# Patient Record
Sex: MALE | Race: WHITE | URBAN - METROPOLITAN AREA
[De-identification: names, ages, dates, MRNs, and addresses within clinical notes are randomized per-mention and may not be internally consistent; named-entity substitution may affect disease eponyms.]

---

## 2017-11-15 NOTE — PROGRESS NOTES
SUBJECTIVE:                                                    Robbin Adam is a 51 year old male who presents to clinic today for the following health issues:    Nail Problem      Duration: 1 year     Description (location/character/radiation): mainly left hand, did notice on a toe in the past. Dry cracking nails, white, possible fungus     Intensity:  severe    Accompanying signs and symptoms: some pain as nails crack open     History (similar episodes/previous evaluation): None    Precipitating or alleviating factors: None    Therapies tried and outcome: otc fungus products - no relief    Has noticed left great toe nail thickening for years also. No pain.     Denies joint pains, rashes, other skin changes, or other sx.  Pt states he washes his hands a lot.  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    Problem list and histories reviewed & adjusted, as indicated.    Patient Active Problem List   Diagnosis     Onychomycosis     Past Surgical History:   Procedure Laterality Date     HC LAP,INGUINAL HERNIA REPR,INITIAL Right     open     LAPAROSCOPIC ASSISTED SIGMOID COLECTOMY  2010ish    laparoscopic for diverticulitis     LAPAROSCOPIC HERNIORRHAPHY INGUINAL Left 12/8/2015    Procedure: LAPAROSCOPIC HERNIORRHAPHY INGUINAL;  Surgeon: David Busby MD;  Location: [facility]       Social History   Substance Use Topics     Smoking status: Current Every Day Smoker     Types: Dip, chew, snus or snuff     Smokeless tobacco: Current User     Types: Chew     Alcohol use Yes     Family History   Problem Relation Age of Onset     Unknown/Adopted Mother      Coronary Artery Disease Father 42     Bypass and then triple bypass     Hypertension Father      Breast Cancer Paternal Grandmother      Colon Cancer Paternal Grandfather      Hyperlipidemia Brother          Current Outpatient Prescriptions   Medication Sig Dispense Refill     terbinafine (LAMISIL) 250 MG tablet Take 1 tablet (250 mg) by mouth daily 90 tablet 0     sildenafil (VIAGRA) 100  MG tablet Take 0.5-1 tablets ( mg) by mouth daily as needed for erectile dysfunction Take 30 min to 4 hours before intercourse.  Never use with nitroglycerin, terazosin or doxazosin. 12 tablet 11     No Known Allergies      ROS:  Constitutional, HEENT, cardiovascular, pulmonary, gi and gu systems are negative, except as otherwise noted.      OBJECTIVE:   /63  Pulse 66  Wt 173 lb 3.2 oz (78.6 kg)  SpO2 97%  BMI 27.13 kg/m2  Body mass index is 27.13 kg/(m^2).  GENERAL: healthy, alert and no distress  MS: no gross musculoskeletal defects noted, no edema  SKIN: left hand nails reveal severe onychomycosis.  Left first toe is also involved.  NEURO: Normal strength and tone, mentation intact and speech normal  PSYCH: mentation appears normal, affect normal/bright      ASSESSMENT/PLAN:       ICD-10-CM    1. Onychomycosis B35.1 terbinafine (LAMISIL) 250 MG tablet     Hepatic panel (Albumin, ALT, AST, Bili, Alk Phos, TP)     Hepatic panel (Albumin, ALT, AST, Bili, Alk Phos, TP)     Start terbinafine for onychomycosis of the left hand digits and left first toe.  Discussed LFT baseline and monitoring.  Return if sx worsen or new sx arise.    Chucky Sotomayor PA-S2    The student acted as a scribe and the encounter documented above was completely performed by myself and the documentation reflects the work I have performed today.   RUTH FernandezC  [facility]

## 2017-11-16 ENCOUNTER — OFFICE VISIT (OUTPATIENT)
Dept: FAMILY MEDICINE | Facility: CLINIC | Age: 52
End: 2017-11-16
Payer: COMMERCIAL

## 2017-11-16 VITALS
DIASTOLIC BLOOD PRESSURE: 63 MMHG | HEART RATE: 66 BPM | BODY MASS INDEX: 27.13 KG/M2 | SYSTOLIC BLOOD PRESSURE: 107 MMHG | WEIGHT: 173.2 LBS | OXYGEN SATURATION: 97 %

## 2017-11-16 DIAGNOSIS — B35.1 ONYCHOMYCOSIS: Primary | ICD-10-CM

## 2017-11-16 LAB
ALBUMIN SERPL-MCNC: 3.8 G/DL (ref 3.4–5)
ALP SERPL-CCNC: 82 U/L (ref 40–150)
ALT SERPL W P-5'-P-CCNC: 49 U/L (ref 0–70)
AST SERPL W P-5'-P-CCNC: 21 U/L (ref 0–45)
BILIRUB DIRECT SERPL-MCNC: 0.2 MG/DL (ref 0–0.2)
BILIRUB SERPL-MCNC: 1 MG/DL (ref 0.2–1.3)
PROT SERPL-MCNC: 6.9 G/DL (ref 6.8–8.8)

## 2017-11-16 PROCEDURE — 36415 COLL VENOUS BLD VENIPUNCTURE: CPT | Performed by: PHYSICIAN ASSISTANT

## 2017-11-16 PROCEDURE — 80076 HEPATIC FUNCTION PANEL: CPT | Performed by: PHYSICIAN ASSISTANT

## 2017-11-16 PROCEDURE — 99213 OFFICE O/P EST LOW 20 MIN: CPT | Performed by: PHYSICIAN ASSISTANT

## 2017-11-16 RX ORDER — TERBINAFINE HYDROCHLORIDE 250 MG/1
250 TABLET ORAL DAILY
Qty: 90 TABLET | Refills: 0 | Status: SHIPPED | OUTPATIENT
Start: 2017-11-16

## 2017-11-16 NOTE — MR AVS SNAPSHOT
"              After Visit Summary   11/16/2017    Robbin Adam    MRN: [id number]           Patient Information     Date Of Birth          1965        Visit Information        Provider Department      11/16/2017 8:40 AM Johnathon Busch PA-C [facility]        Today's Diagnoses     Onychomycosis    -  1       Follow-ups after your visit        Future tests that were ordered for you today     Open Future Orders        Priority Expected Expires Ordered    Hepatic panel (Albumin, ALT, AST, Bili, Alk Phos, TP) Routine 12/16/2017 11/16/2018 11/16/2017            Who to contact     If you have questions or need follow up information about today's clinic visit or your schedule please contact [facility] directly at [phone].  Normal or non-critical lab and imaging results will be communicated to you by MyChart, letter or phone within 4 business days after the clinic has received the results. If you do not hear from us within 7 days, please contact the clinic through MyChart or phone. If you have a critical or abnormal lab result, we will notify you by phone as soon as possible.  Submit refill requests through [employer] or call your pharmacy and they will forward the refill request to us. Please allow 3 business days for your refill to be completed.          Additional Information About Your Visit        [employer] Information     [employer] lets you send messages to your doctor, view your test results, renew your prescriptions, schedule appointments and more. To sign up, go to [email or web address] . Click on \"Log in\" on the left side of the screen, which will take you to the Welcome page. Then click on \"Sign up Now\" on the right side of the page.     You will be asked to enter the access code listed below, as well as some personal information. Please follow the directions to create your username and password.     Your access code is: [email or web address]  Expires: 2/14/2018  9:01 AM   "   Your access code will  in 90 days. If you need help or a new code, please call your [facility] clinic or [phone].        Care EveryWhere ID     This is your Care EveryWhere ID. This could be used by other organizations to access your [facility] medical records  [id number]        Your Vitals Were     Pulse Pulse Oximetry BMI (Body Mass Index)             66 97% 27.13 kg/m2          Blood Pressure from Last 3 Encounters:   17 107/63   12/30/15 127/73   12/08/15 122/74    Weight from Last 3 Encounters:   17 173 lb 3.2 oz (78.6 kg)   12/30/15 157 lb (71.2 kg)   12/08/15 153 lb (69.4 kg)              We Performed the Following     Hepatic panel (Albumin, ALT, AST, Bili, Alk Phos, TP)          Today's Medication Changes          These changes are accurate as of: 17  9:01 AM.  If you have any questions, ask your nurse or doctor.               Start taking these medicines.        Dose/Directions    terbinafine 250 MG tablet   Commonly known as:  lamISIL   Used for:  Onychomycosis   Started by:  Johnathon Busch PA-C        Dose:  250 mg   Take 1 tablet (250 mg) by mouth daily   Quantity:  90 tablet   Refills:  0            Where to get your medicines      These medications were sent to [facility] [address], [address]  [address] [place] [address]     Phone:  [phone]     terbinafine 250 MG tablet                Primary Care Provider Office Phone # Fax #    [facility] [phone] [address] [facility] [address]        Equal Access to Services     [facility] JARROD : Hadii johanna Hernandes, wacelsoda luqadaha, qaybta kaalmada juliano roberts. So [facility] [place] [phone].    ATENCIÓN: Si habla español, tiene a hurtado disposición servicios gratuitos de asistencia lingüística. Llame al [phone].    We comply with applicable federal civil rights laws and [place]  laws. We do not discriminate on the basis of race, color, national origin, age, disability, sex, sexual orientation, or gender identity.            Thank you!     [facility] you for [facility]  [facility]. Our goal is always to provide you with excellent care. Hearing back from our patients is one way we can continue to improve our services. Please take a few minutes to complete the written survey that you may receive in the mail after your visit with us. Thank you!             Your Updated Medication List - Protect others around you: Learn how to safely use, store and throw away your medicines at www.disposemymeds.org.          This list is accurate as of: 11/16/17  9:01 AM.  Always use your most recent med list.                   Brand Name Dispense Instructions for use Diagnosis    sildenafil 100 MG tablet    VIAGRA    12 tablet    Take 0.5-1 tablets ( mg) by mouth daily as needed for erectile dysfunction Take 30 min to 4 hours before intercourse.  Never use with nitroglycerin, terazosin or doxazosin.    Erectile dysfunction, unspecified erectile dysfunction type       terbinafine 250 MG tablet    lamISIL    90 tablet    Take 1 tablet (250 mg) by mouth daily    Onychomycosis

## 2017-11-16 NOTE — NURSING NOTE
"Chief Complaint   Patient presents with     Derm Problem       Initial /63  Pulse 66  Wt 173 lb 3.2 oz (78.6 kg)  SpO2 97%  BMI 27.13 kg/m2 Estimated body mass index is 27.13 kg/(m^2) as calculated from the following:    Height as of 12/30/15: 5' 7\" (1.702 m).    Weight as of this encounter: 173 lb 3.2 oz (78.6 kg).  Medication Reconciliation: complete  Theresa Zaman CMA    "

## 2018-07-27 ENCOUNTER — TELEPHONE (OUTPATIENT)
Dept: FAMILY MEDICINE | Facility: CLINIC | Age: 53
End: 2018-07-27

## 2018-07-27 NOTE — LETTER
[facility]  [address] [facility] [address]  Phone: [phone]    07/27/18    Robbin Adam  [address] [place] [facility] [address]    Robbin,      Our records indicate that you have not scheduled for a(n)colonoscopy which was recommended by your health care team.     If you are receiving any of these services at another site please bring in a copy at your next visit so we can get it scanned into your chart.     Monitoring and managing your preventative and chronic health conditions are very important to us.     If you have received your health care elsewhere, please call the clinic so the information can be documented in your chart.    Please call [phone] or message us through your [employer] account to schedule an appointment or provide information for your chart.     I look forward to seeing you and working with you on your health care needs.     Sincerely,       Your [facility]/rb              *If you have already scheduled an appointment, please disregard this reminder

## 2018-07-27 NOTE — TELEPHONE ENCOUNTER
Panel Management Review      Patient has the following on his problem list: just addressing colonoscopy    Composite cancer screening  Chart review shows that this patient is due/due soon for the following Colonoscopy  Summary:    Patient is due/failing the following:   COLONOSCOPY    Action needed:   Patient needs referral/order: colonoscopy    Type of outreach:    Sent letter.    Questions for provider review:    None                                                                                                                                    Theresa Zaman CMA       Chart routed to closed .